# Patient Record
Sex: FEMALE | Race: WHITE | Employment: FULL TIME | URBAN - METROPOLITAN AREA
[De-identification: names, ages, dates, MRNs, and addresses within clinical notes are randomized per-mention and may not be internally consistent; named-entity substitution may affect disease eponyms.]

---

## 2017-04-21 ENCOUNTER — COMMUNICATION - RIVER FALLS (OUTPATIENT)
Dept: FAMILY MEDICINE | Facility: CLINIC | Age: 22
End: 2017-04-21

## 2017-04-21 ENCOUNTER — OFFICE VISIT - RIVER FALLS (OUTPATIENT)
Dept: FAMILY MEDICINE | Facility: CLINIC | Age: 22
End: 2017-04-21

## 2017-04-21 ASSESSMENT — MIFFLIN-ST. JEOR: SCORE: 1460.28

## 2017-07-07 ENCOUNTER — COMMUNICATION - RIVER FALLS (OUTPATIENT)
Dept: FAMILY MEDICINE | Facility: CLINIC | Age: 22
End: 2017-07-07

## 2017-07-07 ENCOUNTER — OFFICE VISIT - RIVER FALLS (OUTPATIENT)
Dept: FAMILY MEDICINE | Facility: CLINIC | Age: 22
End: 2017-07-07

## 2017-07-20 ENCOUNTER — OFFICE VISIT - RIVER FALLS (OUTPATIENT)
Dept: FAMILY MEDICINE | Facility: CLINIC | Age: 22
End: 2017-07-20

## 2017-07-20 LAB
CREAT SERPL-MCNC: 0.78 MG/DL (ref 0.57–1.11)
GLUCOSE BLD-MCNC: 89 MG/DL (ref 65–100)

## 2017-07-20 ASSESSMENT — MIFFLIN-ST. JEOR: SCORE: 1411.3

## 2017-09-25 ENCOUNTER — OFFICE VISIT - RIVER FALLS (OUTPATIENT)
Dept: FAMILY MEDICINE | Facility: CLINIC | Age: 22
End: 2017-09-25

## 2017-12-29 ENCOUNTER — OFFICE VISIT - RIVER FALLS (OUTPATIENT)
Dept: FAMILY MEDICINE | Facility: CLINIC | Age: 22
End: 2017-12-29

## 2018-05-10 ENCOUNTER — OFFICE VISIT - RIVER FALLS (OUTPATIENT)
Dept: FAMILY MEDICINE | Facility: CLINIC | Age: 23
End: 2018-05-10

## 2018-06-15 ENCOUNTER — OFFICE VISIT - RIVER FALLS (OUTPATIENT)
Dept: FAMILY MEDICINE | Facility: CLINIC | Age: 23
End: 2018-06-15

## 2019-01-07 ENCOUNTER — OFFICE VISIT - RIVER FALLS (OUTPATIENT)
Dept: FAMILY MEDICINE | Facility: CLINIC | Age: 24
End: 2019-01-07

## 2019-05-03 ENCOUNTER — OFFICE VISIT (OUTPATIENT)
Dept: FAMILY MEDICINE | Facility: CLINIC | Age: 24
End: 2019-05-03
Payer: COMMERCIAL

## 2019-05-03 VITALS
RESPIRATION RATE: 16 BRPM | DIASTOLIC BLOOD PRESSURE: 77 MMHG | SYSTOLIC BLOOD PRESSURE: 116 MMHG | WEIGHT: 181 LBS | HEIGHT: 66 IN | HEART RATE: 60 BPM | BODY MASS INDEX: 29.09 KG/M2 | OXYGEN SATURATION: 99 % | TEMPERATURE: 98.7 F

## 2019-05-03 DIAGNOSIS — Z00.00 ROUTINE GENERAL MEDICAL EXAMINATION AT A HEALTH CARE FACILITY: Primary | ICD-10-CM

## 2019-05-03 DIAGNOSIS — Z86.711 HISTORY OF PULMONARY EMBOLISM: ICD-10-CM

## 2019-05-03 DIAGNOSIS — Z12.4 SCREENING FOR CERVICAL CANCER: ICD-10-CM

## 2019-05-03 PROCEDURE — G0145 SCR C/V CYTO,THINLAYER,RESCR: HCPCS | Performed by: NURSE PRACTITIONER

## 2019-05-03 PROCEDURE — 99385 PREV VISIT NEW AGE 18-39: CPT | Performed by: NURSE PRACTITIONER

## 2019-05-03 PROCEDURE — 87491 CHLMYD TRACH DNA AMP PROBE: CPT | Performed by: NURSE PRACTITIONER

## 2019-05-03 RX ORDER — ASPIRIN 81 MG/1
81 TABLET, CHEWABLE ORAL DAILY
COMMUNITY
Start: 2019-05-03

## 2019-05-03 ASSESSMENT — ENCOUNTER SYMPTOMS
DIARRHEA: 0
WEAKNESS: 0
PALPITATIONS: 0
FEVER: 0
ARTHRALGIAS: 0
SHORTNESS OF BREATH: 0
EYE PAIN: 0
DYSURIA: 0
HEMATOCHEZIA: 0
DIZZINESS: 0
CHILLS: 0
JOINT SWELLING: 0
HEMATURIA: 0
PARESTHESIAS: 0
HEADACHES: 0
NAUSEA: 0
BREAST MASS: 0
NERVOUS/ANXIOUS: 1
SORE THROAT: 0
ABDOMINAL PAIN: 0
CONSTIPATION: 0
MYALGIAS: 0
COUGH: 0
FREQUENCY: 0
HEARTBURN: 0

## 2019-05-03 ASSESSMENT — MIFFLIN-ST. JEOR: SCORE: 1588

## 2019-05-03 NOTE — PROGRESS NOTES
SUBJECTIVE:   CC: Vicky Cedillo is an 23 year old woman who presents for preventive health visit.     Healthy Habits:     Getting at least 3 servings of Calcium per day:  Yes    Bi-annual eye exam:  Yes    Dental care twice a year:  NO    Sleep apnea or symptoms of sleep apnea:  None    Diet:  Regular (no restrictions)    Frequency of exercise:  2-3 days/week    Duration of exercise:  30-45 minutes    Taking medications regularly:  Yes    Medication side effects:  Other    PHQ-2 Total Score: 0    Additional concerns today:  No        Today's PHQ-2 Score:   PHQ-2 ( 1999 [employer]) 5/3/2019   Q1: Little interest or pleasure in doing things 0   Q2: Feeling down, depressed or hopeless 0   PHQ-2 Score 0   Q1: Little interest or pleasure in doing things Not at all   Q2: Feeling down, depressed or hopeless Not at all   PHQ-2 Score 0       Abuse: Current or Past(Physical, Sexual or Emotional)- No  Do you feel safe in your environment? Yes    Social History     Tobacco Use     Smoking status: Never Smoker     Smokeless tobacco: Never Used   Substance Use Topics     Alcohol use: Yes     Comment: 4 x week     If you drink alcohol do you typically have >3 drinks per day or >7 drinks per week? Yes      Alcohol Use 5/3/2019   Prescreen: >3 drinks/day or >7 drinks/week? No   No flowsheet data found.    Reviewed orders with patient.  Reviewed health maintenance and updated orders accordingly - Yes  Labs reviewed in EPIC  BP Readings from Last 3 Encounters:   05/03/19 116/77    Wt Readings from Last 3 Encounters:   05/03/19 82.1 kg (181 lb)                  There is no problem list on file for this patient.    History reviewed. No pertinent surgical history.    Social History     Tobacco Use     Smoking status: Never Smoker     Smokeless tobacco: Never Used   Substance Use Topics     Alcohol use: Yes     Comment: 4 x week     Family History   Problem Relation Age of Onset     Diabetes Mother      Heart Disease Father          No  current outpatient medications on file.     Allergies   Allergen Reactions     Augmentin      Bactrim [Sulfamethoxazole W/Trimethoprim]      Azithromycin Rash     Cephalosporins Other (See Comments) and Rash     No lab results found.     Mammogram not appropriate for this patient based on age.    Pertinent mammograms are reviewed under the imaging tab.  History of abnormal Pap smear: NO - age 21-29 PAP every 3 years recommended     Reviewed and updated as needed this visit by clinical staff  Tobacco  Allergies  Meds  Med Hx  Surg Hx  Fam Hx  Soc Hx        Reviewed and updated as needed this visit by Provider          History:  PE from combination birth control. Complete work up was done. She takes a baby asa daily now and she was told that she does not need to follow-up with hematologist.  Anxiety, well controlled.    Birth control: consiering an IUD.  Weight gain with progesterone only birth control.   She is currently using condoms but is considering the IUD.    Review of Systems   Constitutional: Negative for chills and fever.   HENT: Negative for congestion, ear pain, hearing loss and sore throat.    Eyes: Negative for pain and visual disturbance.   Respiratory: Negative for cough and shortness of breath.    Cardiovascular: Negative for chest pain, palpitations and peripheral edema.   Gastrointestinal: Negative for abdominal pain, constipation, diarrhea, heartburn, hematochezia and nausea.   Breasts:  Negative for tenderness, breast mass and discharge.   Genitourinary: Positive for vaginal bleeding and vaginal discharge. Negative for dysuria, frequency, genital sores, hematuria, pelvic pain and urgency.   Musculoskeletal: Negative for arthralgias, joint swelling and myalgias.   Skin: Negative for rash.   Neurological: Negative for dizziness, weakness, headaches and paresthesias.   Psychiatric/Behavioral: Negative for mood changes. The patient is nervous/anxious.         OBJECTIVE:   /77 (BP Location:  "Right arm, Patient Position: Sitting, Cuff Size: Adult Large)   Pulse 60   Temp 98.7  F (37.1  C) (Oral)   Resp 16   Ht 1.669 m (5' 5.7\")   Wt 82.1 kg (181 lb)   LMP 05/01/2019 (Exact Date)   SpO2 99%   Breastfeeding? No   BMI 29.48 kg/m    Physical Exam  GENERAL: healthy, alert and no distress  EYES: Eyes grossly normal to inspection, PERRL and conjunctivae and sclerae normal  HENT: ear canals and TM's normal, nose and mouth without ulcers or lesions  NECK: no adenopathy, no asymmetry, masses, or scars and thyroid normal to palpation  RESP: lungs clear to auscultation - no rales, rhonchi or wheezes  BREAST: normal without masses, tenderness or nipple discharge and no palpable axillary masses or adenopathy  CV: regular rate and rhythm, normal S1 S2, no S3 or S4, no murmur, click or rub, no peripheral edema and peripheral pulses strong  ABDOMEN: soft, nontender, no hepatosplenomegaly, no masses and bowel sounds normal   (female): normal female external genitalia, normal urethral meatus, vaginal mucosa pink, moist, well rugated, and normal cervix/adnexa/uterus without masses or discharge  MS: no gross musculoskeletal defects noted, no edema  SKIN: no suspicious lesions or rashes  NEURO: Normal strength and tone, mentation intact and speech normal  PSYCH: mentation appears normal, affect normal/bright    Diagnostic Test Results:  Results pending    ASSESSMENT/PLAN:   (Z00.00) Routine general medical examination at a health care facility  (primary encounter diagnosis)  Comment:   Plan: Chlamydia trachomatis PCR, OB/GYN REFERRAL, Pap        imaged thin layer screen only - recommended age        21 - 24 years            (Z12.4) Screening for cervical cancer  Comment:   Plan: Pap imaged thin layer screen only - recommended        age 21 - 24 years        Done today     (Z86.711) History of pulmonary embolism  Comment:   Plan: Continue aspirin.  No more combined birth control pills.  Per the patient, no " "additional treatment is needed today.        COUNSELING:  Reviewed preventive health counseling, as reflected in patient instructions    BP Readings from Last 1 Encounters:   05/03/19 116/77     Estimated body mass index is 29.48 kg/m  as calculated from the following:    Height as of this encounter: 1.669 m (5' 5.7\").    Weight as of this encounter: 82.1 kg (181 lb).      Weight management plan: Discussed healthy diet and exercise guidelines     reports that she has never smoked. She has never used smokeless tobacco.      Counseling Resources:  ATP IV Guidelines  Pooled Cohorts Equation Calculator  Breast Cancer Risk Calculator  FRAX Risk Assessment  ICSI Preventive Guidelines  Dietary Guidelines for Americans, 2010  USDA's MyPlate  ASA Prophylaxis  Lung CA Screening    ROBLES Hernandez [facility]  "

## 2019-05-06 LAB
C TRACH DNA SPEC QL NAA+PROBE: NEGATIVE
SPECIMEN SOURCE: NORMAL

## 2019-05-07 LAB
COPATH REPORT: NORMAL
PAP: NORMAL

## 2019-05-07 NOTE — RESULT ENCOUNTER NOTE
Chino Perry,    this note is to let you know that your recent test for chlamydia came back negative.    Let me know if you have any questions.    Chichi ARBOLEDA CNP

## 2020-03-11 ENCOUNTER — HEALTH MAINTENANCE LETTER (OUTPATIENT)
Age: 25
End: 2020-03-11

## 2021-01-03 ENCOUNTER — HEALTH MAINTENANCE LETTER (OUTPATIENT)
Age: 26
End: 2021-01-03

## 2021-10-10 ENCOUNTER — HEALTH MAINTENANCE LETTER (OUTPATIENT)
Age: 26
End: 2021-10-10

## 2022-01-30 ENCOUNTER — HEALTH MAINTENANCE LETTER (OUTPATIENT)
Age: 27
End: 2022-01-30

## 2022-02-11 VITALS
BODY MASS INDEX: 25.19 KG/M2 | TEMPERATURE: 98.8 F | BODY MASS INDEX: 24.62 KG/M2 | SYSTOLIC BLOOD PRESSURE: 126 MMHG | WEIGHT: 151.4 LBS | SYSTOLIC BLOOD PRESSURE: 108 MMHG | DIASTOLIC BLOOD PRESSURE: 74 MMHG | TEMPERATURE: 97.8 F | HEART RATE: 79 BPM | HEART RATE: 80 BPM | DIASTOLIC BLOOD PRESSURE: 72 MMHG | WEIGHT: 147.8 LBS | HEIGHT: 65 IN

## 2022-02-11 VITALS
DIASTOLIC BLOOD PRESSURE: 68 MMHG | WEIGHT: 169.8 LBS | HEART RATE: 66 BPM | BODY MASS INDEX: 28.26 KG/M2 | OXYGEN SATURATION: 99 % | SYSTOLIC BLOOD PRESSURE: 118 MMHG | TEMPERATURE: 99.4 F

## 2022-02-11 VITALS
TEMPERATURE: 97.8 F | SYSTOLIC BLOOD PRESSURE: 116 MMHG | OXYGEN SATURATION: 99 % | BODY MASS INDEX: 30.29 KG/M2 | DIASTOLIC BLOOD PRESSURE: 80 MMHG | HEART RATE: 68 BPM | WEIGHT: 182 LBS

## 2022-02-11 VITALS
SYSTOLIC BLOOD PRESSURE: 118 MMHG | HEART RATE: 72 BPM | DIASTOLIC BLOOD PRESSURE: 76 MMHG | WEIGHT: 157 LBS | BODY MASS INDEX: 26.13 KG/M2

## 2022-02-11 VITALS
DIASTOLIC BLOOD PRESSURE: 76 MMHG | WEIGHT: 164.4 LBS | HEART RATE: 86 BPM | TEMPERATURE: 98.6 F | SYSTOLIC BLOOD PRESSURE: 122 MMHG | OXYGEN SATURATION: 99 %

## 2022-02-11 VITALS
BODY MASS INDEX: 26.42 KG/M2 | HEIGHT: 65 IN | TEMPERATURE: 98 F | SYSTOLIC BLOOD PRESSURE: 124 MMHG | WEIGHT: 158.6 LBS | DIASTOLIC BLOOD PRESSURE: 68 MMHG | HEART RATE: 76 BPM

## 2022-02-11 VITALS
TEMPERATURE: 98.3 F | SYSTOLIC BLOOD PRESSURE: 134 MMHG | HEART RATE: 76 BPM | WEIGHT: 151 LBS | BODY MASS INDEX: 25.13 KG/M2 | DIASTOLIC BLOOD PRESSURE: 92 MMHG

## 2022-02-16 NOTE — LETTER
(Inserted Image. Unable to display) January 11, 2019Re: AMPARO ROSSDOB:  1995 [facility] [address] [place], [place] [address]Xm:  [facility]The following patient has been referred to your office/practice:   AMPARO ROSSAppointment : ?Location : [facility] refer to the attached clinical documentation for a summary of AMPARO's care.  Please do not hesitate to contact our office if any additional clinical questions arise. All relevant records and transition of care documents should be mailed or faxed.Your assistance in providing continuity of care is appreciatedSincerely, [facility] & [address] [place] [address](P) [phone](F) [phone]

## 2022-02-16 NOTE — PROGRESS NOTES
Patient:   AMPARO ROSS            MRN: [id number]            FIN: [id number]               Age:   21 years     Sex:  Female     :  1995   Associated Diagnoses:   Urinary frequency   Author:   Macy Holguin      Chief Complaint   2017 12:15 PM CDT   f/u UTI/kidney infx. Reports having reaction (rash) to Septra. Has been off Cipro for 5 days now- states sxs are returning.        History of Present Illness   Concerning symptoms as listed in Chief Complaint above discussed and confirmed with patient  She is worried about recurrent UTIS  Seen by me in april, declined STI testing then.  Treated for UTI then and resolved  Seen again  and treated with bactrim (I reviewed that culture with her today which was positive), but she had drug rxn and went to ER with fever and back pain and states she had kidney infection, put on cipro , able to finish it and symptoms resolved  Now for last 3 days feels it might be coming back OR she might just be so anxious about it. WOrries about kidney.   SHe has schedule GYN eval at home in 3 days, defers that today  No fever, no back pain today, some trouble fully emptying bladder, works outside and drinks alot of water         Review of Systems   Constitutional:  No fever, No chills.    Gastrointestinal:  No nausea, No vomiting.       Health Status   Allergies:    Allergic Reactions (Selected)  Severity Not Documented  Augmentin (No reactions were documented)  Azithromycin (Rash)  Cephalosporins (Rash and swelling)  Septra DS (Rash)   Medications:  (Selected)   Documented Medications  Documented  LORazepam: po, tid, Instructions: pt unsure of dose, takes only as needed, PRN: as needed for anxiety, 0 Refill(s), Type: Maintenance  OCP: OCP, See Instructions, Supply, 0 Refill(s), Type: Maintenance   Problem list:    No problem items selected or recorded.      Histories   Past Medical History:    No active or resolved past medical history items have been selected or recorded.    Family History:    Heart disease  Father (Raoul)  Diabetes mellitus type I  Mother (Sharonda)     Procedure history:    No active procedure history items have been selected or recorded.   Social History:        Alcohol Assessment            Current      Tobacco Assessment            Never      Substance Abuse Assessment            Never      Employment and Education Assessment            Student      Nutrition and Health Assessment            Type of diet: Regular.      Exercise and Physical Activity Assessment            Exercise frequency: 3-4 times/week.  Exercise type: Weight lifting, cardio.      Sexual Assessment            Sexually active: No.  Sexual orientation: Heterosexual.      Other Assessment            last eye exam: 2014        Physical Examination   Vital Signs   7/20/2017 12:15 PM CDT Temperature Tympanic 97.8 DegF  LOW    Peripheral Pulse Rate 80 bpm    Pulse Site Radial artery    HR Method Manual    Systolic Blood Pressure 108 mmHg    Diastolic Blood Pressure 74 mmHg    Mean Arterial Pressure 85 mmHg    BP Site Right arm    BP Method Manual      Measurements from flowsheet : Measurements   7/20/2017 12:15 PM CDT Height Measured - Standard 65 in    Weight Measured - Standard 147.8 lb    BSA 1.75 m2    Body Mass Index 24.59 kg/m2      General:  Alert and oriented, No acute distress.    Genitourinary:  No costovertebral angle tenderness.    Integumentary:  Warm, Dry, Pink, No rash.       Review / Management   Results review:  Lab results   7/20/2017 1:22 PM CDT UA pH < OR = 5.0    UA Specific Gravity < OR = 1.005    UA Glucose NEGATIVE    UA Bilirubin NEGATIVE    UA Ketones NEGATIVE    Urine Occult Blood NEGATIVE    UA Protein NEGATIVE    UA Nitrite NEGATIVE    UA Leukocyte Esterase NEGATIVE   7/20/2017 1:21 PM CDT Sodium Level 139 mmol/L    Potassium Level 3.6 mmol/L    Chloride Level 104 mmol/L    CO2 Level 24 mmol/L    AGAP 11    Glucose Level 89 mg/dL    BUN 10 mg/dL    Creatinine Level 0.78 mg/dL     BUN/Creat Ratio 13    eGFR  >60    eGFR Non-African American >60    Calcium Level 9.9 mg/dL   .       Impression and Plan   Diagnosis     Urinary frequency (QDT88-PS R35.0).     Patient Instructions:       Counseled: Patient, Regarding diagnosis, Regarding treatment, Regarding medications, Verbalized understanding.    Orders     Orders (Selected)   Outpatient Orders  Ordered (In Transit)  Culture, Urine, Routine* ([facility]): Specimen Type: Urine (Clean Catch), Collection Date: 07/20/17 12:54:00 CDT.     avoid bladder irritants  reassured that kidney function looks good  keep GYN appt next week, if that is normal and symptoms persist advised consider appt with Dr Peralta.

## 2022-02-16 NOTE — NURSING NOTE
Comprehensive Intake Entered On:  1/7/2019 12:23 PM CST    Performed On:  1/7/2019 12:17 PM CST by Mackenzie Woodruff CMA               Summary   Chief Complaint :   f/u PE from May 2018. Finished Xarelto about 2 weeks ago. Stopped ocp in November d/t nausea. Also refill Zoloft and Macrobid.    Menstrual Status :   Menarcheal   Weight Measured :   182 lb(Converted to: 182 lb 0 oz, 82.55 kg)    Systolic Blood Pressure :   116 mmHg   Diastolic Blood Pressure :   80 mmHg   Mean Arterial Pressure :   92 mmHg   Peripheral Pulse Rate :   68 bpm   BP Site :   Right arm   Pulse Site :   Radial artery   BP Method :   Manual   HR Method :   Manual   Temperature Tympanic :   97.8 DegF(Converted to: 36.6 DegC)  (LOW)    Oxygen Saturation :   99 %   Race :      Languages :   English   Ethnicity :   Not  or    Mackenzie Woodruff CMA - 1/7/2019 12:17 PM CST   Health Status   Allergies Verified? :   Yes   Medication History Verified? :   Yes   Pre-Visit Planning Status :   Completed   Mackenzie Woodruff CMA - 1/7/2019 12:17 PM CST   Meds / Allergies   (As Of: 1/7/2019 12:23:22 PM CST)   Allergies (Active)   Augmentin  Estimated Onset Date:   Unspecified ; Comments:     Comment 1: seen by allergist and ruled out not allergic, states ok to take along wiht Benadryl   ; Created By:   Fabby Jones CMA; Reaction Status:   Active ; Category:   Drug ; Substance:   Augmentin ; Type:   Allergy ; Updated By:   Fabby Jones CMA; Reviewed Date:   6/15/2018 4:43 PM CDT      azithromycin  Estimated Onset Date:   Unspecified ; Reactions:   rash ; Created By:   oR Dumont CMA; Reaction Status:   Active ; Category:   Drug ; Substance:   azithromycin ; Type:   Allergy ; Updated By:   Ro Dumont CMA; Reviewed Date:   6/15/2018 4:43 PM CDT      cephalosporins  Estimated Onset Date:   Unspecified ; Reactions:   rash, swelling ; Created By:   Ro Dumont; Reaction Status:   Active ; Category:   Drug ; Substance:    cephalosporins ; Type:   Allergy ; Updated By:   Ro Dumont; Reviewed Date:   6/15/2018 4:43 PM CDT      Septra DS  Estimated Onset Date:   Unspecified ; Reactions:   rash ; Created By:   Jenny Sims RN; Reaction Status:   Active ; Category:   Drug ; Substance:   Septra DS ; Type:   Allergy ; Updated By:   Jenny Sims RN; Reviewed Date:   6/15/2018 4:43 PM CDT        Medication List   (As Of: 1/7/2019 12:23:22 PM [facility])   Prescription/Discharge Order    LORazepam 1 mg oral tablet  :   LORazepam 1 mg oral tablet ; Status:   Prescribed ; Ordered As Mnemonic:   LORazepam 1 mg oral tablet ; Simple Display Line:   See Instructions, TAKE ONE TABLET BY MOUTH THREE TIMES A DAY, 30 unknown unit, 1 Refill(s) ; Ordering Provider:   Cynthia Burger; Catalog Code:   LORazepam ; Order Dt/Tm:   11/2/2018 5:04:48 PM          sertraline  :   sertraline ; Status:   Prescribed ; Ordered As Mnemonic:   sertraline 25 mg oral tablet ; Simple Display Line:   25 mg, 1 tab(s), PO, Daily, 90 tab(s), 3 Refill(s) ; Ordering Provider:   Cynthia Burger; Catalog Code:   sertraline ; Order Dt/Tm:   12/29/2017 9:18:53 AM            Home Meds    nitrofurantoin  :   nitrofurantoin ; Status:   Documented ; Ordered As Mnemonic:   Nitro Macro 50 mg oral capsule ; Simple Display Line:   See Instructions, 1 cap(s) po within 6 hours after sexual activity to prevent UTI, 0 Refill(s) ; Catalog Code:   nitrofurantoin ; Order Dt/Tm:   12/29/2017 9:02:19 AM

## 2022-02-16 NOTE — PROGRESS NOTES
Patient:   AMPARO ROSS            MRN: [id number]            FIN: [id number]               Age:   21 years     Sex:  Female     :  1995   Associated Diagnoses:   Urinary tract infection, site not specified   Author:   Dave Headley MD      Visit Information      Date of Service: 2017 09:26 am  Performing Location: [facility]  Encounter#: [id number]      Primary Care Provider (PCP):  NONE ,       Referring Provider:  Dave Headley MD    NPI# [id number]      Chief Complaint   2017 9:39 AM CDT     Patient is here with c/o dysuria, increased urinary frequency.    Also having right lower back pain. Did take AZO last night.      Interval History   The patient is a 21-year-old female with a history of urinary tract infections and episodes of bacterial vaginosis.  She is in today with a two day history of urinary tract infection symptoms.  She has taken over-the-counter suppressant, AZO, and had some pressure relief but symptoms have not resolved despite pushing fluids.  She is in for antibiotic treatment.  She does have allergies as listed.  She has no vaginitis or vaginosis symptoms at the moment but is concerned that antibiotics may aggravate that.  She has no other concerns.      Review of Systems   Review  of systems is negative except as documented under interval history.      Health Status   Allergies:    Allergic Reactions (Selected)  Severity Not Documented  Augmentin (No reactions were documented)  Cephalosporins (Rash and swelling)   Medications:  (Selected)   Prescriptions  Prescribed  Septra  mg-160 mg oral tablet: 1 tab(s), po, bid, # 6 tab(s), 0 Refill(s), Type: Maintenance, Pharmacy: [facility], 1 tab(s) po bid  Documented Medications  Documented  OCP: OCP, See Instructions, Supply, 0 Refill(s), Type: Maintenance   Problem list:    No problem items selected or recorded.      Histories   Past Medical History:    No active or resolved past  medical history items have been selected or recorded.   Family History:    Heart disease  Father (Raoul)  Diabetes mellitus type I  Mother (Sharonda)     Procedure history:    No active procedure history items have been selected or recorded.   Social History:        Alcohol Assessment            Current      Tobacco Assessment            Never      Substance Abuse Assessment            Never      Employment and Education Assessment            Student      Nutrition and Health Assessment            Type of diet: Regular.      Exercise and Physical Activity Assessment            Exercise frequency: 3-4 times/week.  Exercise type: Weight lifting, cardio.      Sexual Assessment            Sexually active: No.  Sexual orientation: Heterosexual.      Other Assessment            last eye exam: 2014        Physical Examination   Vital Signs   7/7/2017 9:39 AM CDT Temperature Tympanic 98.8 DegF    Peripheral Pulse Rate 79 bpm    HR Method Electronic    Systolic Blood Pressure 126 mmHg    Diastolic Blood Pressure 72 mmHg    Mean Arterial Pressure 90 mmHg    BP Site Right arm    BP Method Electronic      General:  U.A. is mildly abnormal.  See chart..       Impression and Plan   Diagnosis     Urinary tract infection, site not specified (XMG49-TC N39.0).     Urinary tract infection, symptomatic..     Plan:  The patient will be placed on Septra DS x three days.  We did have some discussion regarding bacterial vaginosis..    Patient Instructions:       Counseled: Patient, Regarding diagnosis, Regarding treatment, Regarding medications, Verbalized understanding.

## 2022-02-16 NOTE — NURSING NOTE
CAGE Assessment Entered On:  1/10/2019 8:12 AM CST    Performed On:  1/7/2019 8:12 AM CST by Marcelle Rand               Assessment   Have you ever felt you should cut down on your drinking :   No   Have people annoyed you by criticizing your drinking :   No   Have you ever felt bad or guilty about your drinking :   No   Have you ever taken a drink first thing in the morning to steady your nerves or get rid of a hangover (Eye-opener) :   No   CAGE Score :   0    Marcelle Rand - 1/10/2019 8:12 AM CST

## 2022-02-16 NOTE — PROGRESS NOTES
Patient:   AMAPRO ROSS            MRN: [id number]            FIN: [id number]               Age:   22 years     Sex:  Female     :  1995   Associated Diagnoses:   Multiple pulmonary emboli   Author:   Israel Hughes MD      Visit Information      Date of Service: 05/10/2018 04:32 pm  Performing Location: [facility]  Encounter#: [id number]      Primary Care Provider (PCP):  NONE ,       Referring Provider:  Israel Hughes MD    NPI# [id number]      Chief Complaint   5/10/2018 4:56 PM CDT    f/u ER   dx'd with PE              Additional Information:No additional information recorded during visit.   Chief complaint and symptoms as noted above and confirmed with patient.  Recent lab and diagnostic studies reviewed with patient      History of Present Illness   5/10/2018: Marcela presents for emergency room follow-up earlier this week after presenting with substernal chest pain.  Workup significant for bilateral pulmonary emboli.  This is her first venous thromboembolism.  Describes a family history specifically involving her mother side of recurrent clotting disorders.  She has been on oral hormone contraceptives for the past 5-6 years.  No known bleeding complications.  Intends on returning here this coming .  Denies any current chest pain or shortness of breath.  Was able to  prescription for Xarelto         Review of Systems   Constitutional:  No fever, No chills.    Eye:  Negative except as documented in history of present illness.    Ear/Nose/Mouth/Throat:  Negative except as documented in history of present illness.    Respiratory:  No shortness of breath.    Cardiovascular:  No chest pain, No palpitations, No peripheral edema, No syncope.    Gastrointestinal:  No nausea, No vomiting, No abdominal pain.    Genitourinary:  No dysuria, No hematuria.    Hematology/Lymphatics:  Negative except as documented in history of present illness.    Endocrine:  No excessive thirst, No  polyuria.    Immunologic:  No recurrent fevers.    Musculoskeletal:  No joint pain, No muscle pain.    Neurologic:  Alert and oriented X4, No numbness, No tingling, No headache.    Psychiatric:  Anxiety.       Health Status   Allergies:    Allergic Reactions (Selected)  Severity Not Documented  Augmentin (No reactions were documented)  Azithromycin (Rash)  Cephalosporins (Rash and swelling)  Septra DS (Rash)   Medications:  (Selected)   Prescriptions  Prescribed  LORazepam 1 mg oral tablet: 1 tab(s) ( 1 mg ), po, tid, # 30 tab(s), 1 Refill(s), Type: Maintenance, Pharmacy: Replaced by [facility], 1 tab(s) po tid  Xarelto 20 mg oral tablet: 1 tab(s) ( 20 mg ), po, qpm, # 30 tab(s), 5 Refill(s), Type: Maintenance, Pharmacy: Replaced by [facility], 1 tab(s) po qpm  sertraline 25 mg oral tablet: 1 tab(s) ( 25 mg ), PO, Daily, # 90 tab(s), 3 Refill(s), Type: Maintenance, Pharmacy: Replaced by [facility], 1 tab(s) po daily  Documented Medications  Documented  Nitro Macro 50 mg oral capsule: See Instructions, Instructions: 1 cap(s) po within 6 hours after sexual activity to prevent UTI, 0 Refill(s), Type: Maintenance  OCP: OCP, See Instructions, Supply, 0 Refill(s), Type: Maintenance  Xarelto 15 mg oral tablet: 1 tab(s) ( 15 mg ), po, bid, 0 Refill(s), Type: Maintenance   Problem list:    No problem items selected or recorded.      Histories   Past Medical History:    No active or resolved past medical history items have been selected or recorded.   Family History:    Heart disease  Father (Raoul)  Diabetes mellitus type I  Mother (Sharonda)     Procedure history:    No active procedure history items have been selected or recorded.   Social History:        Alcohol Assessment            Current, 1-2 times per week, 2 drinks/episode average.      Tobacco Assessment            Never      Substance Abuse Assessment            Never      Employment and Education Assessment            Student       Nutrition and Health Assessment            Type of diet: Regular.      Exercise and Physical Activity Assessment            Exercise frequency: 3-4 times/week.  Exercise type: Weight lifting, cardio.      Sexual Assessment            Sexually active: No.  Sexual orientation: Heterosexual.      Other Assessment            last eye exam: 2014        Physical Examination   vital signs stable, as noted above   Vital Signs   5/10/2018 4:56 PM CDT Temperature Tympanic 98.6 DegF    Peripheral Pulse Rate 86 bpm    Systolic Blood Pressure 122 mmHg    Diastolic Blood Pressure 76 mmHg    Mean Arterial Pressure 91 mmHg    BP Site Right arm    Oxygen Saturation 99 %      Measurements from flowsheet : Measurements   5/10/2018 4:56 PM CDT    Weight Measured - Standard                164.4 lb     General:  Alert and oriented, No acute distress.    Eye:  Extraocular movements are intact.    HENT:  Normocephalic, Oral mucosa is moist.    Neck:  Supple, No carotid bruit, No jugular venous distention.    Respiratory:  Lungs are clear to auscultation, Respirations are non-labored.    Cardiovascular:  Normal rate, Regular rhythm, No murmur, No gallop, No edema.    Gastrointestinal:  Soft, Non-tender, Non-distended.    Musculoskeletal:  Normal range of motion, Normal strength, No tenderness.    Neurologic:  Alert, Oriented, Normal motor function, No focal deficits.    Cognition and Speech:  Oriented, Speech clear and coherent.    Psychiatric:  Appropriate mood & affect.       Impression and Plan   Diagnosis     Multiple pulmonary emboli (MRN04-IW I26.99).     - CT PE protocol on 5/10/2018 demonstrating bilateral subsegmental PEs  - started on Xarelto 15mg BID for 3 weeks, then 20mg daily for at least planned 6 months  - Factor V Leiden testing drawn in ED; results pending (Vicky think significant family h/o factor V mutation)  - would recommend thrombophilia work-up in the future given such young age and relatively unprovoked nature of  event, though would wait toward end of 6 months of planned therapy  - I would recommend formal hematology evaluation given such young age  - needs to find alternative contraception option other then estrogen  - monitor menses while on NoAc    RTC in 5 months

## 2022-02-16 NOTE — NURSING NOTE
Generalized Anxiety Disorder Screening Entered On:  1/10/2019 8:12 AM CST    Performed On:  1/7/2019 8:12 AM CST by Marcelle Rand               Generalized Anxiety Disorder Screening   JAIME Nervous, Anxious On Edge :   Several days   JAIME Control Worrying B :   Several days   AJIME Worrying Too Much :   Several days   JAIME Restless :   Not at all   JAIME Easily Annoyed/Irritable :   Not at all   JAIME Afraid :   Several days   JAIME Trouble Relaxing :   Not at all   JAIME Total Screening Score :   4    JAIME Difficulty with Work, Home, Others :   Somewhat difficult   Marcelle Rand - 1/10/2019 8:12 AM CST

## 2022-02-16 NOTE — PROGRESS NOTES
Patient:   AMPARO ROSS            MRN: [id number]            FIN: [id number]               Age:   21 years     Sex:  Female     :  1995   Associated Diagnoses:   Acute cystitis   Author:   Macy Holguin      Chief Complaint   2017 9:50 AM CDT    c/o uti sx--dysuria, burning sensation, slight hematuria.        History of Present Illness             The patient presents with symptoms of a urinary tract infection.  The urinary tract infection is characterized by dysuria, hematuria and urinary frequency.  The severity of the urinary tract infection symptom(s) is moderate.  The urinary tract infection symptom(s) has lasted for 1 day(s).  same partner 6 months, unsure if CT screen with this partner, no symptoms  No condoms  LMP--last week, with placebo pills.  Exacerbating factors consist of none.  Relieving factors consist of fluids.     Concerning symptoms as listed in Chief Complaint above discussed and confirmed with patient         Review of Systems   Constitutional:  No fever, No chills.    Gastrointestinal:  No nausea, No vomiting.       Health Status   Allergies:    Allergic Reactions (Selected)  Severity Not Documented  Augmentin (No reactions were documented)  Cephalosporins (Rash and swelling)   Medications:  (Selected)      Problem list:    No problem items selected or recorded.      Histories   Past Medical History:    No active or resolved past medical history items have been selected or recorded.   Family History:    Heart disease  Father (Raoul)  Diabetes mellitus type I  Mother (Sharonda)     Procedure history:    No active procedure history items have been selected or recorded.   Social History:        Alcohol Assessment            Current      Tobacco Assessment            Never      Substance Abuse Assessment            Never      Employment and Education Assessment            Student      Nutrition and Health Assessment            Type of diet: Regular.      Exercise and Physical Activity  Assessment            Exercise frequency: 3-4 times/week.  Exercise type: Weight lifting, cardio.      Sexual Assessment            Sexually active: No.  Sexual orientation: Heterosexual.      Other Assessment            last eye exam: 2014        Physical Examination   Vital Signs   4/21/2017 9:50 AM CDT Temperature Tympanic 98 DegF    Peripheral Pulse Rate 76 bpm    Pulse Site Radial artery    HR Method Manual    Systolic Blood Pressure 124 mmHg    Diastolic Blood Pressure 68 mmHg    Mean Arterial Pressure 87 mmHg    BP Site Left arm    BP Method Manual      Measurements from flowsheet : Measurements   4/21/2017 9:50 AM CDT Height Measured - Standard 65 in    Weight Measured - Standard 158.6 lb    BSA 1.81 m2    Body Mass Index 26.39 kg/m2      General:  Alert and oriented, No acute distress.    Genitourinary:  No costovertebral angle tenderness.    Integumentary:  Warm, Dry, Pink, No rash.       Review / Management   Results review:  Lab results   4/21/2017 10:01 AM CDT UA Color Yellow    UA Clarity Clear    UA pH 5.5    UA Specific Gravity <=1.005    UA Glucose Negative mg/dL    UA Bilirubin Negative    UA Ketones Negative mg/dL    Urine Occult Blood Large    UA Protein Negative mg/dL    UA Nitrite Negative    UA Leukocyte Esterase Moderate    UA Urobilinogen Normal    UA Epithelial Cells Few    UA WBC 11-25    UA RBC 6-10    UA Bacteria None Seen   .       Impression and Plan   Diagnosis     Acute cystitis (UTZ51-AY N30.00).     Patient Instructions:       Counseled: Patient, Regarding diagnosis, Regarding treatment, Regarding medications, Verbalized understanding.    Orders     Orders (Selected)   Prescriptions  Prescribed  Bactrim  mg-160 mg oral tablet: 1 tab(s), PO, BID, # 6 tab(s), 0 Refill(s), Type: Maintenance, Pharmacy: [facility], 1 tab(s) po bid,x3 day(s)  Pyridiate 200 mg oral tablet: 1 tab(s) ( 200 mg ), PO, TID, # 9 tab(s), 0 Refill(s), Type: Maintenance, Pharmacy:  [facility] [place], 1 tab(s) po tid,x3 day(s).     Make a practice of using the bathroom to urinate after intercourse,  consuming adequate water daily (between 6-8 glasses), do not 'hold' your urine,  avoid bladder irritants--soda, caffeine, alcohol   Return to the clinic for re evaluation if worsening or simply not improving. Consider CT screen at that time   .

## 2022-02-16 NOTE — NURSING NOTE
Depression Screening Entered On:  1/10/2019 8:12 AM CST    Performed On:  1/7/2019 8:12 AM CST by Marcelle Rand               Depression Screening   Feeling Down, Depressed, Hopeless :   Not at all   Little Interest - Pleasure in Activities :   Not at all   Initial Depression Screen Score :   0    Trouble Falling or Staying Asleep :   Several days   Feeling Tired or Little Energy :   Several days   Poor Appetite or Overeating :   Several days   Feeling Bad About Yourself :   Not at all   Trouble Concentrating :   Not at all   Moving or Speaking Slowly :   Not at all   Thoughts Better Off Dead or Hurting Self :   Not at all   Detailed Depression Screen Score :   3    Total Depression Screen Score :   3    JAIME Difficulty with Work, Home, Others :   Not difficult at all   Marcelle Rand - 1/10/2019 8:12 AM CST

## 2022-02-16 NOTE — PROGRESS NOTES
Patient:   AMPARO ROSS            MRN: [id number]            FIN: [id number]               Age:   21 years     Sex:  Female     :  1995   Associated Diagnoses:   None   Author:   Cynthia Burger      Chief Complaint   2017 6:54 PM CDT    Pt c/o dysuria and hematuria since this am.        History of Present Illness   PPC with sudden and intense dysuria and hematuria.  had diarrhea last week, boyfriend out of town but was home this weekend and had intercourse which usually triggers her UTIs  Urine culture 2017 showed staph, urine culture 2017 showed e coli  she has had a kidney infection this summer  she has multiple antibiotic allergies, she has panic attacks when thinking about new antibiotic, rash is the reaction, no anaphylaxis  had cipro this summer and thought she had joint pain, was able to complete the antibiotic though,   she is crying in room d/t anxiety about taking antibiotic      Review of Systems   Constitutional:  Negative.    Eye:  Negative.    Ear/Nose/Mouth/Throat:  Negative.    Respiratory:  Negative.    Cardiovascular:  Negative.    Gastrointestinal:  Negative.    Genitourinary:  Dysuria.    Gynecologic:  Negative.    Hematology/Lymphatics:  Negative.    Endocrine:  Negative.    Musculoskeletal:  Negative.    Integumentary:  Negative.    Psychiatric:  Anxiety.             Health Status   Allergies:    Allergic Reactions (Selected)  Severity Not Documented  Augmentin (No reactions were documented)  Azithromycin (Rash)  Cephalosporins (Rash and swelling)  Septra DS (Rash)   Medications:  (Selected)   Prescriptions  Prescribed  Cipro 500 mg oral tablet: 1 tab(s) ( 500 mg ), PO, q12hr, # 14 tab(s), 0 Refill(s), Type: Maintenance, Pharmacy: [facility], 1 tab(s) po q12 hrs,x7 day(s)  LORazepam 0.5 mg oral tablet: 1 tab(s) ( 0.5 mg ), PO, TID, PRN: for anxiety, # 5 tab(s), 0 Refill(s), Type: Maintenance, Pharmacy: [facility], 1 tab(s)  po tid,PRN:for anxiety  predniSONE 20 mg oral tablet: 2 tab(s) ( 40 mg ), po, daily, # 10 tab(s), 0 Refill(s), Type: Maintenance, Pharmacy: [facility], 2 tab(s) po daily,x5 day(s)  Documented Medications  Documented  LORazepam: po, tid, Instructions: pt unsure of dose, takes only as needed, PRN: as needed for anxiety, 0 Refill(s), Type: Maintenance  OCP: OCP, See Instructions, Supply, 0 Refill(s), Type: Maintenance  sertraline 25 mg oral tablet: 1 tab(s) ( 25 mg ), PO, Daily, # 30 tab(s), 0 Refill(s), Type: Maintenance      Histories   Family History:    Heart disease  Father (Raoul)  Diabetes mellitus type I  Mother (Sharonda)        Physical Examination   Vital Signs   9/25/2017 6:54 PM CDT Temperature Tympanic 98.3 DegF    Peripheral Pulse Rate 76 bpm    Pulse Site Radial artery    HR Method Manual    Systolic Blood Pressure 134 mmHg    Diastolic Blood Pressure 92 mmHg  HI    Mean Arterial Pressure 106 mmHg    BP Site Right arm    BP Method Manual      General:  Alert and oriented, No acute distress.    Eye:  Pupils are equal, round and reactive to light.    Gastrointestinal:  Soft, Non-tender, Non-distended.    Genitourinary:  No inguinal tenderness, bilateral flank pain.    Lymphatics:  No lymphadenopathy neck, axilla, groin.    Integumentary:  Warm, Dry, Pink.    Neurologic:  Alert, Oriented, Normal sensory.    Psychiatric:  Cooperative, very anxious, Not appropriate mood & affect.       Review / Management   Results review:  Lab results   9/25/2017 7:15 PM CDT UA Epithelial Cells Few    UA WBC 6-10    UA RBC 6-10    UA Bacteria Few   9/25/2017 7:04 PM CDT UA pH 6.0    UA Specific Gravity < OR = 1.005    UA Glucose NEGATIVE    UA Bilirubin NEGATIVE    UA Ketones NEGATIVE    Urine Occult Blood 3+    UA Protein 1+    UA Nitrite NEGATIVE    UA Leukocyte Esterase 3+   , urine culture pending.       Impression and Plan   Plan:       Diet: Fluids encouraged.    Patient Instructions:        Counseled: Patient, Regarding diagnosis, Regarding treatment, Regarding medications, Regarding activity, Verbalized understanding.    Summary:  #5 lorazepam to help her calm down d/t anxiety about potential allergic reaction  encouraged her to use benadryl OTC to help limit rash  will use prednisone to help limit rash  cipro d/t UA and micro and given hx, last culture shows good sensitvity to this  referral to allergist  if there is dyspnea, difficulty swallowing or hives while on prednisone please go to ED.    Orders     Orders (Selected)   Prescriptions  Prescribed  Cipro 500 mg oral tablet: 1 tab(s) ( 500 mg ), PO, q12hr, # 14 tab(s), 0 Refill(s), Type: Maintenance, Pharmacy: [facility], 1 tab(s) po q12 hrs,x7 day(s)  LORazepam 0.5 mg oral tablet: 1 tab(s) ( 0.5 mg ), PO, TID, PRN: for anxiety, # 5 tab(s), 0 Refill(s), Type: Maintenance, Pharmacy: [facility], 1 tab(s) po tid,PRN:for anxiety  predniSONE 20 mg oral tablet: 2 tab(s) ( 40 mg ), po, daily, # 10 tab(s), 0 Refill(s), Type: Maintenance, Pharmacy: [facility], 2 tab(s) po daily,x5 day(s).

## 2022-02-16 NOTE — PROGRESS NOTES
Patient:   AMPARO ROSS            MRN: [id number]            FIN: [id number]               Age:   22 years     Sex:  Female     :  1995   Associated Diagnoses:   Contraceptive management; History of pulmonary embolus (PE)   Author:   Cynthia Burger      Visit Information      Primary Care Provider (PCP):  NONE ,       Chief Complaint   6/15/2018 4:35 PM CDT    birth control opptions        History of Present Illness   PPC to discuss contraceptive options, sexually active now, condoms: LNMP finished last week  stopped COCs two months ago  on xarelto 20mg daily, therapy will be done around mid December; negative for Factor V leiden  pt had physical with pap last yr at outside clinic, I do not have these records  monogamous relationship      Review of Systems   Constitutional:  Negative.    Eye:  Negative.    Ear/Nose/Mouth/Throat:  Negative.    Respiratory:  Negative, no difficulty breathing.    Cardiovascular:  Negative.    Breast:  Negative.    Gastrointestinal:  Negative.    Genitourinary:  Negative.    Gynecologic:  Negative except as documented in history of present illness.    Hematology/Lymphatics:  Negative except as documented in history of present illness.    Endocrine:  Negative.    Immunologic:  Negative.    Musculoskeletal:  Negative.    Integumentary:  Negative.    Neurologic:  Negative.    Psychiatric:  Negative.       Health Status   Allergies:    Allergic Reactions (Selected)  Severity Not Documented  Augmentin (No reactions were documented)  Azithromycin (Rash)  Cephalosporins (Rash and swelling)  Septra DS (Rash)   Medications:  (Selected)   Prescriptions  Prescribed  LORazepam 1 mg oral tablet: 1 tab(s) ( 1 mg ), po, tid, # 30 tab(s), 1 Refill(s), Type: Maintenance, Pharmacy: [facility], 1 tab(s) po tid  Xarelto 20 mg oral tablet: 1 tab(s) ( 20 mg ), po, qpm, # 30 tab(s), 5 Refill(s), Type: Maintenance, Pharmacy: [id number], 1 tab(s) po  qpm  sertraline 25 mg oral tablet: 1 tab(s) ( 25 mg ), PO, Daily, # 90 tab(s), 3 Refill(s), Type: Maintenance, Pharmacy: [facility], 1 tab(s) po daily  Documented Medications  Documented  Nitro Macro 50 mg oral capsule: See Instructions, Instructions: 1 cap(s) po within 6 hours after sexual activity to prevent UTI, 0 Refill(s), Type: Maintenance  OCP: OCP, See Instructions, Supply, 0 Refill(s), Type: Maintenance  Xarelto 15 mg oral tablet: 1 tab(s) ( 15 mg ), po, bid, 0 Refill(s), Type: Maintenance      Histories   Family History:    Heart disease  Father (Raoul)  Diabetes mellitus type I  Mother (Sharonda)        Physical Examination   Vital Signs   6/15/2018 4:35 PM CDT Temperature Tympanic 99.4 DegF    Peripheral Pulse Rate 66 bpm    Pulse Site Radial artery    HR Method Electronic    Systolic Blood Pressure 118 mmHg    Diastolic Blood Pressure 68 mmHg    Mean Arterial Pressure 85 mmHg    BP Site Right arm    BP Method Manual    Oxygen Saturation 99 %      Measurements from flowsheet : Measurements   6/15/2018 4:35 PM CDT Height/Length Estimated 65 in    Weight Measured - Standard 169.8 lb      General:  Alert and oriented, No acute distress.    Eye:  Pupils are equal, round and reactive to light.    HENT:  Normocephalic.    Respiratory:  Lungs are clear to auscultation, Respirations are non-labored.    Cardiovascular:  Regular rhythm, No edema.    Musculoskeletal:  Normal range of motion, Normal gait.    Integumentary:  Warm, Dry, Pink.    Neurologic:  Alert, Oriented, Normal sensory, No focal deficits.    Psychiatric:  Cooperative, Appropriate mood & affect, Normal judgment.       Impression and Plan   Diagnosis     Contraceptive management (OLL96-OY Z30.9).     History of pulmonary embolus (PE) (WLL58-PS Z86.711).     Patient Instructions:       Counseled: Patient, Regarding diagnosis, Regarding treatment, Regarding medications, Regarding activity, Verbalized understanding.    Summary:   xarelto for 6 months of therapy: due to end 12/15/18  towards of therapy will see hematology, coagulopathy work up  discussed contraceptive options  IUD: юлия, mirena and paragard and difference between them  nexplanon  mini pill  condoms/spermicide    pt would like to use the mini pill, discussed when to start this, she may have lighter periods, some women will stop having periods  needs to be taken at the same time everyday, this has to be structured and routine, to increase protection risk can continue to use condoms  does not protect against STDs, discussed when to start this pill and potential side effects, one potential side effect is blood clot as there are some women who will continue to be at risk on the mini pill also but pregnancy is a much higher risk time for potential clotting    recommend annual exam in next 12months as I do not have one on file here   .    Orders     Orders (Selected)   Prescriptions  Prescribed  Nor-QD 0.35 mg oral tablet: 1 tab(s) ( 0.35 mg ), PO, Daily, # 84 tab(s), 4 Refill(s), Type: Maintenance, Pharmacy: [facility], 1 tab(s) po daily.

## 2022-02-16 NOTE — PROGRESS NOTES
Patient:   AMPARO ROSS            MRN: [id number]            FIN: [id number]               Age:   23 years     Sex:  Female     :  1995   Associated Diagnoses:   Anxiety; History of embolism   Author:   Cynthia Burger      Visit Information      Primary Care Provider (PCP):  Cynthia Burger    NPI# [id number]      Chief Complaint   2019 12:17 PM CST    f/u PE from May 2018. Finished Xarelto about 2 weeks ago. Stopped ocp in November d/t nausea. Also refill Zoloft and Macrobid.      History of Present Illness   finished xarelto 2018, would like hematology referral to see if we can identify a coagulopathy disorder  using condoms because mini pill caused nausea and she cannot use estrogen  would like to remain on 25mg sertraline daily; PHQ9=3 , GAD7=4  macrobid prn after intercourse, would like this refilled  no other concerns      Review of Systems   Constitutional:  Negative.    Ear/Nose/Mouth/Throat:  Negative.    Respiratory:  Negative.    Cardiovascular:  Negative.    Gastrointestinal:  Negative.    Genitourinary:  Negative except as documented in history of present illness.    Hematology/Lymphatics:  Negative, see above.    Immunologic:  Negative.    Musculoskeletal:  Negative.    Integumentary:  Negative.    Neurologic:  Negative.    Psychiatric:  Anxiety, Depression, No francisca, Not suicidal, Not delusional, No hallucinations.       Health Status   Allergies:    Allergic Reactions (Selected)  Severity Not Documented  Augmentin (No reactions were documented)  Azithromycin (Rash)  Cephalosporins (Rash and swelling)  Septra DS (Rash)   Medications:  (Selected)   Prescriptions  Prescribed  LORazepam 1 mg oral tablet: See Instructions, Instructions: TAKE ONE TABLET BY MOUTH THREE TIMES A DAY, # 30 unknown unit, 1 Refill(s), Type: Soft Stop, Pharmacy: [facility] [place]  Macrobid 100 mg oral capsule: See Instructions, Instructions: one tab after intercourse, # 30 tab(s), 0  Refill(s), Type: Maintenance, Pharmacy: [facility] [place], one tab after intercourse  sertraline 25 mg oral tablet: = 1 tab(s) ( 25 mg ), PO, Daily, # 90 tab(s), 3 Refill(s), Type: Maintenance, Pharmacy: [facility] [place], 1 tab(s) Oral daily  Documented Medications  Documented  Nitro Macro 50 mg oral capsule: See Instructions, Instructions: 1 cap(s) po within 6 hours after sexual activity to prevent UTI, 0 Refill(s), Type: Maintenance      Histories   Past Medical History:    No active or resolved past medical history items have been selected or recorded.   Family History:    Heart disease  Father (Raoul)  Diabetes mellitus type I  Mother (Sharonda)     Social History:        Alcohol Assessment            Current, 1-2 times per week, 2 drinks/episode average.      Tobacco Assessment            Never      Substance Abuse Assessment            Never      Employment and Education Assessment            Student      Nutrition and Health Assessment            Type of diet: Regular.      Exercise and Physical Activity Assessment            Exercise frequency: 3-4 times/week.  Exercise type: Weight lifting, cardio.      Sexual Assessment            Sexually active: No.  Sexual orientation: Heterosexual.      Other Assessment            last eye exam: 2014      Physical Examination   Vital Signs   1/7/2019 12:17 PM CST Temperature Tympanic 97.8 DegF  LOW    Peripheral Pulse Rate 68 bpm    Pulse Site Radial artery    HR Method Manual    Systolic Blood Pressure 116 mmHg    Diastolic Blood Pressure 80 mmHg    Mean Arterial Pressure 92 mmHg    BP Site Right arm    BP Method Manual    Oxygen Saturation 99 %      General:  Alert and oriented, No acute distress.    Eye:  Pupils are equal, round and reactive to light.    HENT:  Normocephalic.    Neck:  Supple.    Musculoskeletal:  Normal range of motion.    Integumentary:  Warm, Dry, Pink.    Neurologic:  Alert, Oriented, Normal sensory, No focal deficits.     Psychiatric:  Cooperative, Appropriate mood & affect, Normal judgment, Non-suicidal.       Impression and Plan   Diagnosis     Anxiety (EHH05-CN F41.9).     History of embolism ([id number] Z86.718).     Patient Instructions:       Counseled: Patient, Regarding diagnosis, Regarding treatment, Regarding medications, Verbalized understanding.    Summary:  refilled macrobid  refilled sertraline  consistent condom use, avoiding pregnancy until after hematology work up  hematology referral  .    Orders     Orders (Selected)   Prescriptions  Prescribed  Macrobid 100 mg oral capsule: See Instructions, Instructions: one tab after intercourse, # 30 tab(s), 0 Refill(s), Type: Maintenance, Pharmacy: [facility], one tab after intercourse  sertraline 25 mg oral tablet: = 1 tab(s) ( 25 mg ), PO, Daily, # 90 tab(s), 3 Refill(s), Type: Maintenance, Pharmacy: [facility], 1 tab(s) Oral daily.

## 2022-02-16 NOTE — PROGRESS NOTES
Patient:   AMPARO ROSS            MRN: [id number]            FIN: [id number]               Age:   22 years     Sex:  Female     :  1995   Associated Diagnoses:   Anxiety   Author:   Cynthia Burger      Visit Information      Primary Care Provider (PCP):  NONE ,       Chief Complaint   2017 8:58 AM CST   f/u anxiety. Did have annual exam elsewhere in [place]. Refill Ativan and Zoloft. Things going well.        History of Present Illness   PPC for refill of long standing sertraline prescription 25mg daily which she uses to control anxiety,   has been on this prescription for 4yrs, feels it controls it well and woudl like to remain on this  sees gyn for annual exams  pdmp shows last lorazepam fill 17  uses benzo during exam week to help her sleep and rarely during month if havign break through panic attack  PHQ9=4  GAD7=10         Review of Systems   Constitutional:  Negative.    Ear/Nose/Mouth/Throat:  Negative.    Respiratory:  Negative.    Cardiovascular:  Negative.    Gastrointestinal:  Negative.    Hematology/Lymphatics:  Negative.    Immunologic:  Negative.    Musculoskeletal:  Negative.    Integumentary:  Negative.    Neurologic:  Negative.    Psychiatric:  Anxiety, No depression, No francisca, Not suicidal, Not delusional, No hallucinations.       Health Status   Allergies:    Allergic Reactions (Selected)  Severity Not Documented  Augmentin (No reactions were documented)  Azithromycin (Rash)  Cephalosporins (Rash and swelling)  Septra DS (Rash)   Medications:  (Selected)   Prescriptions  Prescribed  LORazepam 1 mg oral tablet: 1 tab(s) ( 1 mg ), po, tid, # 30 tab(s), 1 Refill(s), Type: Maintenance, Pharmacy: [facility], 1 tab(s) po tid  sertraline 25 mg oral tablet: 1 tab(s) ( 25 mg ), PO, Daily, # 90 tab(s), 3 Refill(s), Type: Maintenance, Pharmacy: [facility], 1 tab(s) po daily  Documented Medications  Documented  Nitro Macro 50 mg oral  capsule: See Instructions, Instructions: 1 cap(s) po within 6 hours after sexual activity to prevent UTI, 0 Refill(s), Type: Maintenance  OCP: OCP, See Instructions, Supply, 0 Refill(s), Type: Maintenance      Histories   Past Medical History:    No active or resolved past medical history items have been selected or recorded.   Family History:    Heart disease  Father (Raoul)  Diabetes mellitus type I  Mother (Sharonda)     Social History:        Alcohol Assessment            Current      Tobacco Assessment            Never      Substance Abuse Assessment            Never      Employment and Education Assessment            Student      Nutrition and Health Assessment            Type of diet: Regular.      Exercise and Physical Activity Assessment            Exercise frequency: 3-4 times/week.  Exercise type: Weight lifting, cardio.      Sexual Assessment            Sexually active: No.  Sexual orientation: Heterosexual.      Other Assessment            last eye exam: 2014        Physical Examination   Vital Signs   12/29/2017 8:58 AM CST Peripheral Pulse Rate 72 bpm    Pulse Site Radial artery    HR Method Manual    Systolic Blood Pressure 118 mmHg    Diastolic Blood Pressure 76 mmHg    Mean Arterial Pressure 90 mmHg    BP Site Right arm    BP Method Manual      General:  Alert and oriented, No acute distress.    Eye:  Pupils are equal, round and reactive to light.    HENT:  Normocephalic.    Neck:  Supple.    Musculoskeletal:  Normal range of motion.    Integumentary:  Warm, Dry, Pink.    Neurologic:  Alert, Oriented, Normal sensory, No focal deficits.    Psychiatric:  Cooperative, Appropriate mood & affect, Normal judgment, Non-suicidal.       Impression and Plan   Diagnosis     Anxiety ([id number] F41.9).     Patient Instructions:          Limitations: Alcohol consumption.         Counseled: Patient, Regarding diagnosis, Regarding treatment, Regarding medications, Activity, Verbalized understanding.    Summary:   discussed medication dosing, comfortable at low dose sertraline as higher doses make her feel apathetic  discussed alternatives to benzos like vistaril  discussed side effects of ativan, do not take with alcohol or other sleep aides, do not take and drive car.    Orders     Orders (Selected)   Prescriptions  Prescribed  LORazepam 1 mg oral tablet: 1 tab(s) ( 1 mg ), po, tid, # 30 tab(s), 1 Refill(s), Type: Maintenance, Pharmacy: [facility], 1 tab(s) po tid  sertraline 25 mg oral tablet: 1 tab(s) ( 25 mg ), PO, Daily, # 90 tab(s), 3 Refill(s), Type: Maintenance, Pharmacy: [facility], 1 tab(s) po daily.

## 2022-09-18 ENCOUNTER — HEALTH MAINTENANCE LETTER (OUTPATIENT)
Age: 27
End: 2022-09-18

## 2023-05-07 ENCOUNTER — HEALTH MAINTENANCE LETTER (OUTPATIENT)
Age: 28
End: 2023-05-07